# Patient Record
Sex: MALE | Race: WHITE | NOT HISPANIC OR LATINO | URBAN - METROPOLITAN AREA
[De-identification: names, ages, dates, MRNs, and addresses within clinical notes are randomized per-mention and may not be internally consistent; named-entity substitution may affect disease eponyms.]

---

## 2018-06-21 ENCOUNTER — EMERGENCY (EMERGENCY)
Facility: HOSPITAL | Age: 38
LOS: 0 days | Discharge: HOME | End: 2018-06-21
Admitting: PHYSICIAN ASSISTANT

## 2018-06-21 VITALS
DIASTOLIC BLOOD PRESSURE: 87 MMHG | OXYGEN SATURATION: 97 % | RESPIRATION RATE: 20 BRPM | HEART RATE: 95 BPM | SYSTOLIC BLOOD PRESSURE: 130 MMHG | TEMPERATURE: 98 F

## 2018-06-21 DIAGNOSIS — J30.9 ALLERGIC RHINITIS, UNSPECIFIED: ICD-10-CM

## 2018-06-21 DIAGNOSIS — R09.81 NASAL CONGESTION: ICD-10-CM

## 2018-06-21 NOTE — ED PROVIDER NOTE - MEDICAL DECISION MAKING DETAILS
Claritin and Flonase as discussed; follow up with PCP in 2-3 days; any new or worsening symptoms, pt will return to ER   Note complete

## 2018-06-21 NOTE — ED PROVIDER NOTE - OBJECTIVE STATEMENT
38 y.o. male presented to the ER c/o nasal congestion for the past 2 days.  Pt denies fever, chills, cough, sore throat.  Admits to h/o allergies.

## 2018-06-21 NOTE — ED PROVIDER NOTE - ENMT, MLM
Airway patent, Nasal mucosa pale and boggy- (+) clear rhinorrhea. Mouth with normal mucosa. Throat has no vesicles, no oropharyngeal exudates and uvula is midline, TM's clear bilaterally, no sinus tenderness

## 2023-11-19 NOTE — ED PROVIDER NOTE - CPE EDP CARDIAC NORM
Ward  Clinic: [facility]  MICHELLE Obtained: No  Primary Care Physician: Yes  Name: Sebastian Blue DO  Phone: [phone]  Address: [place], [place]  Clinic: [facility]  MICHELLE Obtained: No  Therapist: None  : Current Effects of Withdrawal  Effects of Use/Current Withdrawal  Effects of Substance Withdrawal: Denies  Consequences of Use  Consequences of Use: Denies    Assessment Summary  Assessment Summary  Assessment Summary: Pt is a 55 year old male presenting to [facility] with a chief complaint of psychiatric evaluation. On arrival, the nurse's note stated, \"Patient arrives [facility] with C/o Needed a psych eval. Patient states that he has \"bigs, weeds & needles growing on his skin\" and there is a \"man\" inside his body that \"sews his skin shut.\" Patient states he gets injections for his tape worms in his body, pt says it turns them into string.\" Pt stated reason for ED admission, \"100 pins and needles. There is something inside my body. I had snakes in my body for many years. I had to drink special coffee to melt them down. I had snakes up the butt after I got out of the mental health hosptial. I had to drink coffee 24/7. I have plastic bags in my body. I sing a song, \"Please  take a bird bath and be a good girl.\" Pt reports a history of multiple psychiatric admissions. Most recent admission was at [facility] in 2020. Pt is connected with an outpatient psychiatrist. Pt reports that he has remained medication complaint. Pt reported that his Risperidone dosage was recently changed. He denied any suicidal or homicidal ideation, intent or plan. There are signs of delusions and other indicators of psychotic process. Pt presenting with grandiose delusions. 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 Prasanna Matamoros and Fransisco Crowell are my brothers. I am very rich.\" His thought content with tactile hallucinations. Pt stated, \"I have tape worms all over me. A long nosed rat grabbed onto my penis. The snakes multiplied.\" Pt also expressed to writer that he has snakes all over his body. Pt reported that his  sister, who he feels is not actually his sister is a \"control freak.\" Pt accused this writer of being \"crazy.\" Pt does appear to be in a psychotic state. Pt's thought process was very difficult to follow and speech was hyperverbal. Pt has poor reality testing and appears to be out of touch with reality. Pt is calm and cooperative with no signs of agitation or mood lability. The pt appears to meet criteria for involuntary psychiatric hospitalization. The pt currently has fixed delusions which appear to be unmanaged. Pt will be admitted to stabilize his mood and symptoms. This writer will work to transfer the pt to an appropriate facility. The case was staffed with the attending physician  who concurs with the disposition.    Physician Recommended LOC  Recommended Level of Care   Recommended Level of Care: IP  Type of Treatment Recommended: Mental Health  ED Physician Agrees with Psychiatrist Level of Care (non-[facility] Intake): Yes    Reasons for Level of Treatment  Reasons for Level of Treatment  Reason(s) for Inpatient Treatment: Impaired reality testing with disordered behavior, and potential for causing danger to self/others/property    Primary Diagnosis  Primary Diagnosis  State ICD 10 Diagnosis: F20.9 Schizophrenia    Referral Source Notified  Referral Source  Referral Source Notified: No referral source    Weapons Intervention  Weapons Intervention  Do You Have Any Weapons or Firearms at Home?: No  Duty to Warn Completed: Not applicable (admitted to inpatient)  Unable to Complete Duty to Warn: Not applicable    Intake Assessment Completed  Intake Assessment Completed  Intake Assessment Completed: Yes  Total Face to Face Time: 60 min    FOID Clear and Present Danger Reporting   FOID Clear and Present Danger Reporting  Event Type: Clear and Present Danger  Clear and Present Danger Event Date: 11/19/23  Clear and Present Danger: A. Communicates a serious threat of physical violence to himself/herself or  others  Qualified Examiner First Name: Aureliano  Qualified Examiner Last Name: Calvin  normal...
